# Patient Record
Sex: MALE | Race: WHITE | NOT HISPANIC OR LATINO
[De-identification: names, ages, dates, MRNs, and addresses within clinical notes are randomized per-mention and may not be internally consistent; named-entity substitution may affect disease eponyms.]

---

## 2017-03-17 ENCOUNTER — APPOINTMENT (OUTPATIENT)
Dept: BARIATRICS | Facility: CLINIC | Age: 57
End: 2017-03-17

## 2017-03-20 ENCOUNTER — FORM ENCOUNTER (OUTPATIENT)
Age: 57
End: 2017-03-20

## 2017-03-21 ENCOUNTER — OUTPATIENT (OUTPATIENT)
Dept: OUTPATIENT SERVICES | Facility: HOSPITAL | Age: 57
LOS: 1 days | Discharge: HOME | End: 2017-03-21
Payer: COMMERCIAL

## 2017-03-21 ENCOUNTER — APPOINTMENT (OUTPATIENT)
Dept: GASTROENTEROLOGY | Facility: HOSPITAL | Age: 57
End: 2017-03-21

## 2017-03-21 PROCEDURE — 43235 EGD DIAGNOSTIC BRUSH WASH: CPT | Mod: XS

## 2017-03-21 PROCEDURE — 91110 GI TRC IMG INTRAL ESOPH-ILE: CPT

## 2017-03-21 PROCEDURE — 43235 EGD DIAGNOSTIC BRUSH WASH: CPT

## 2017-03-21 PROCEDURE — 91110 GI TRC IMG INTRAL ESOPH-ILE: CPT | Mod: 26

## 2017-03-22 ENCOUNTER — FORM ENCOUNTER (OUTPATIENT)
Age: 57
End: 2017-03-22

## 2017-03-26 DIAGNOSIS — D50.9 IRON DEFICIENCY ANEMIA, UNSPECIFIED: ICD-10-CM

## 2017-03-26 DIAGNOSIS — Z98.84 BARIATRIC SURGERY STATUS: ICD-10-CM

## 2017-03-26 DIAGNOSIS — E66.9 OBESITY, UNSPECIFIED: ICD-10-CM

## 2017-03-26 DIAGNOSIS — M10.9 GOUT, UNSPECIFIED: ICD-10-CM

## 2018-05-21 ENCOUNTER — APPOINTMENT (OUTPATIENT)
Dept: BARIATRICS | Facility: CLINIC | Age: 58
End: 2018-05-21

## 2018-05-21 VITALS — WEIGHT: 269 LBS | BODY MASS INDEX: 39.84 KG/M2 | HEIGHT: 69 IN

## 2018-05-23 ENCOUNTER — APPOINTMENT (OUTPATIENT)
Dept: BARIATRICS | Facility: CLINIC | Age: 58
End: 2018-05-23
Payer: COMMERCIAL

## 2018-05-23 VITALS
HEIGHT: 67.5 IN | SYSTOLIC BLOOD PRESSURE: 142 MMHG | DIASTOLIC BLOOD PRESSURE: 90 MMHG | OXYGEN SATURATION: 98 % | WEIGHT: 269 LBS | BODY MASS INDEX: 41.73 KG/M2 | TEMPERATURE: 98 F

## 2018-05-23 PROCEDURE — 99203 OFFICE O/P NEW LOW 30 MIN: CPT

## 2018-06-12 ENCOUNTER — OTHER (OUTPATIENT)
Age: 58
End: 2018-06-12

## 2018-06-18 ENCOUNTER — OTHER (OUTPATIENT)
Age: 58
End: 2018-06-18

## 2018-07-11 ENCOUNTER — APPOINTMENT (OUTPATIENT)
Dept: BARIATRICS | Facility: CLINIC | Age: 58
End: 2018-07-11

## 2018-07-23 ENCOUNTER — APPOINTMENT (OUTPATIENT)
Dept: BARIATRICS | Facility: CLINIC | Age: 58
End: 2018-07-23
Payer: COMMERCIAL

## 2018-07-23 VITALS — HEIGHT: 67.5 IN | WEIGHT: 271.25 LBS | BODY MASS INDEX: 42.08 KG/M2

## 2018-07-23 PROCEDURE — 97803 MED NUTRITION INDIV SUBSEQ: CPT

## 2018-08-01 ENCOUNTER — APPOINTMENT (OUTPATIENT)
Dept: BARIATRICS | Facility: CLINIC | Age: 58
End: 2018-08-01

## 2018-09-05 ENCOUNTER — APPOINTMENT (OUTPATIENT)
Dept: BARIATRICS | Facility: CLINIC | Age: 58
End: 2018-09-05
Payer: COMMERCIAL

## 2018-09-05 VITALS — WEIGHT: 273 LBS | HEIGHT: 67.5 IN | BODY MASS INDEX: 42.35 KG/M2

## 2018-09-05 PROCEDURE — 97803 MED NUTRITION INDIV SUBSEQ: CPT

## 2018-09-05 PROCEDURE — 97802 MEDICAL NUTRITION INDIV IN: CPT

## 2018-10-10 ENCOUNTER — APPOINTMENT (OUTPATIENT)
Dept: BARIATRICS | Facility: CLINIC | Age: 58
End: 2018-10-10
Payer: COMMERCIAL

## 2018-10-10 VITALS — HEIGHT: 67.5 IN | BODY MASS INDEX: 41.73 KG/M2 | WEIGHT: 269 LBS

## 2018-10-10 PROCEDURE — 97803 MED NUTRITION INDIV SUBSEQ: CPT

## 2018-12-05 ENCOUNTER — APPOINTMENT (OUTPATIENT)
Dept: BARIATRICS | Facility: CLINIC | Age: 58
End: 2018-12-05

## 2019-06-28 ENCOUNTER — APPOINTMENT (OUTPATIENT)
Dept: GASTROENTEROLOGY | Facility: CLINIC | Age: 59
End: 2019-06-28
Payer: COMMERCIAL

## 2019-06-28 PROCEDURE — 43235 EGD DIAGNOSTIC BRUSH WASH: CPT

## 2022-07-22 ENCOUNTER — APPOINTMENT (OUTPATIENT)
Dept: BARIATRICS | Facility: CLINIC | Age: 62
End: 2022-07-22

## 2022-07-29 ENCOUNTER — APPOINTMENT (OUTPATIENT)
Dept: BARIATRICS | Facility: CLINIC | Age: 62
End: 2022-07-29

## 2022-07-29 VITALS — WEIGHT: 255 LBS | BODY MASS INDEX: 39.35 KG/M2

## 2022-07-29 DIAGNOSIS — G47.30 SLEEP APNEA, UNSPECIFIED: ICD-10-CM

## 2022-07-29 PROCEDURE — 99214 OFFICE O/P EST MOD 30 MIN: CPT | Mod: 95

## 2022-07-29 NOTE — ASSESSMENT
[FreeTextEntry1] :  Patient will follow up with medical weight loss program  and to possibly start on Ozempic. Will order blood work and upper GI series.

## 2022-07-29 NOTE — REASON FOR VISIT
[Home] : at home, [unfilled] , at the time of the visit. [Medical Office: ([facility])___] : at the medical office located in  [Verbal consent obtained from patient] : the patient, [unfilled] [Follow-Up Visit] : a follow-up visit for

## 2022-07-29 NOTE — HISTORY OF PRESENT ILLNESS
[de-identified] : Lexa Segal 62 y/o male came in today for a consult for a possible revision. Patient's weight before surgery was 367 lbs, s/p gastric bypass in 2007 and patient's current weight is around 255 lbs. Patient reports the most active thing he does is walking constantly during work. Patient reports he has tried many dieting regime with little to no help in decreasing his weight. Discussed with the patient that bariatric surgery would be aggressive operation with a risk of losing muscle mass and bone loss. Discussed with the patient that patient was able to lose 70 pounds with in a few years and surgery may not be the best option as of now. Discussed with the patient the next step should be going to the medical weight loss program to start on Ozempic. Discussed with the patient the importance of maintaining a healthy lifestyle and following a healthy diet and exercising regularly. Will order blood work and upper GI series.

## 2024-02-07 ENCOUNTER — NON-APPOINTMENT (OUTPATIENT)
Age: 64
End: 2024-02-07

## 2024-02-08 ENCOUNTER — TRANSCRIPTION ENCOUNTER (OUTPATIENT)
Age: 64
End: 2024-02-08

## 2024-03-01 ENCOUNTER — APPOINTMENT (OUTPATIENT)
Dept: BARIATRICS | Facility: CLINIC | Age: 64
End: 2024-03-01
Payer: COMMERCIAL

## 2024-03-01 VITALS
OXYGEN SATURATION: 98 % | WEIGHT: 236 LBS | BODY MASS INDEX: 36.42 KG/M2 | DIASTOLIC BLOOD PRESSURE: 72 MMHG | TEMPERATURE: 97.2 F | HEART RATE: 58 BPM | SYSTOLIC BLOOD PRESSURE: 135 MMHG

## 2024-03-01 DIAGNOSIS — K56.609 UNSPECIFIED INTESTINAL OBSTRUCTION, UNSPECIFIED AS TO PARTIAL VERSUS COMPLETE OBSTRUCTION: ICD-10-CM

## 2024-03-01 PROCEDURE — 99203 OFFICE O/P NEW LOW 30 MIN: CPT

## 2024-03-01 RX ORDER — ALLOPURINOL 200 MG/1
TABLET ORAL
Refills: 0 | Status: ACTIVE | COMMUNITY

## 2024-03-01 RX ORDER — LIRAGLUTIDE 6 MG/ML
18 INJECTION, SOLUTION SUBCUTANEOUS
Qty: 2 | Refills: 3 | Status: COMPLETED | COMMUNITY
Start: 2018-05-23 | End: 2024-03-01

## 2024-03-01 NOTE — ADDENDUM
[FreeTextEntry1] :  Documented by Cheryl Torres acting as a Scribe for Dr. Gonzalo Ayala on Mar  1 2024 11:30AM

## 2024-03-01 NOTE — ASSESSMENT
[FreeTextEntry1] : 62 y/o M following up with the bariatrics center, post-op with Dr. Camacho, s/p gastric bypass in 2007. Had an obstruction and internal hernia through an adhesion, surgery was done on 02/08. Gained weight, from 222 to 236 current. Pt feels gassy and bloated, lots of pain after eating since surgery on 02/08. Taking miralax and colace twice a day, was instructed to post-surgery, if not pt becomes constipated. No edema in legs.

## 2024-03-01 NOTE — END OF VISIT
[FreeTextEntry3] : All medical record entries made by the Scribe were at my, Dr. Gonzalo Ayala, direction and personally dictated by me on Mar  1 2024 11:30AM. I have reviewed the chart and agree that the record accurately reflects my personal performance of the history, physical exam, assessment and plan. I have also personally directed, reviewed, and agreed with the chart.

## 2024-03-01 NOTE — HISTORY OF PRESENT ILLNESS
[de-identified] : 62 y/o M following up with the bariatrics center, post-op with Dr. Camacho, s/p gastric bypass in 2007. Had an obstruction and internal hernia through an adhesion.

## 2024-03-14 ENCOUNTER — APPOINTMENT (OUTPATIENT)
Dept: BARIATRICS | Facility: CLINIC | Age: 64
End: 2024-03-14
Payer: COMMERCIAL

## 2024-03-14 VITALS — BODY MASS INDEX: 35.95 KG/M2 | WEIGHT: 233 LBS

## 2024-03-14 PROCEDURE — 97802 MEDICAL NUTRITION INDIV IN: CPT | Mod: 93

## 2024-03-15 ENCOUNTER — APPOINTMENT (OUTPATIENT)
Dept: BARIATRICS | Facility: CLINIC | Age: 64
End: 2024-03-15
Payer: COMMERCIAL

## 2024-03-15 ENCOUNTER — APPOINTMENT (OUTPATIENT)
Dept: BARIATRICS | Facility: CLINIC | Age: 64
End: 2024-03-15

## 2024-03-15 PROCEDURE — 97803 MED NUTRITION INDIV SUBSEQ: CPT | Mod: 93

## 2024-05-10 ENCOUNTER — APPOINTMENT (OUTPATIENT)
Dept: BARIATRICS | Facility: CLINIC | Age: 64
End: 2024-05-10
Payer: COMMERCIAL

## 2024-05-10 VITALS — WEIGHT: 228 LBS | BODY MASS INDEX: 35.18 KG/M2

## 2024-05-10 PROCEDURE — 97803 MED NUTRITION INDIV SUBSEQ: CPT | Mod: 93

## 2024-06-04 ENCOUNTER — APPOINTMENT (OUTPATIENT)
Dept: BARIATRICS/WEIGHT MGMT | Facility: CLINIC | Age: 64
End: 2024-06-04
Payer: COMMERCIAL

## 2024-06-04 VITALS
WEIGHT: 235 LBS | HEIGHT: 67.5 IN | BODY MASS INDEX: 36.45 KG/M2 | DIASTOLIC BLOOD PRESSURE: 85 MMHG | SYSTOLIC BLOOD PRESSURE: 134 MMHG

## 2024-06-04 DIAGNOSIS — E66.9 OBESITY, UNSPECIFIED: ICD-10-CM

## 2024-06-04 DIAGNOSIS — Z98.84 ABNORMAL WEIGHT GAIN: ICD-10-CM

## 2024-06-04 DIAGNOSIS — R63.5 ABNORMAL WEIGHT GAIN: ICD-10-CM

## 2024-06-04 PROCEDURE — 99204 OFFICE O/P NEW MOD 45 MIN: CPT

## 2024-06-04 PROCEDURE — G2211 COMPLEX E/M VISIT ADD ON: CPT

## 2024-06-05 PROBLEM — E66.9 OBESITY (BMI 30-39.9): Status: ACTIVE | Noted: 2024-06-05

## 2024-06-05 PROBLEM — R63.5 WEIGHT GAIN STATUS POST GASTRIC BYPASS: Status: ACTIVE | Noted: 2018-05-23

## 2024-06-05 NOTE — HISTORY OF PRESENT ILLNESS
[Improved Health] : Improved health [Quality of Life] : Quality of life [FreeTextEntry1] : Medical Hx: DANIEL, small bowel obstruction Surgical Hx: RYGB Surgery Date: 2007 (335lb>210 lbs), Hernia repair 2/2024   He is interested in achieving more weight loss.   Previous medications:  Phen phen years ago Phentermine years ago Daily GLP1 yrs ago   Food Recall: Seeing RD, last visit 5/2024 ETOH: 2 drinks per night-wine/cocktail    Exercise Regimen: Walking; no formal physical activity    Labs: 2/2024 Creatinine: 1.01 GFR: 79

## 2024-06-14 RX ORDER — SEMAGLUTIDE 0.25 MG/.5ML
0.25 INJECTION, SOLUTION SUBCUTANEOUS
Qty: 2 | Refills: 2 | Status: ACTIVE | COMMUNITY
Start: 2024-06-14 | End: 1900-01-01

## 2024-07-10 ENCOUNTER — APPOINTMENT (OUTPATIENT)
Dept: BARIATRICS/WEIGHT MGMT | Facility: CLINIC | Age: 64
End: 2024-07-10
Payer: COMMERCIAL

## 2024-07-10 VITALS — BODY MASS INDEX: 35.06 KG/M2 | WEIGHT: 226 LBS | HEIGHT: 67.5 IN

## 2024-07-10 DIAGNOSIS — E66.9 OBESITY, UNSPECIFIED: ICD-10-CM

## 2024-07-10 PROCEDURE — 99213 OFFICE O/P EST LOW 20 MIN: CPT | Mod: 95

## 2024-08-15 ENCOUNTER — APPOINTMENT (OUTPATIENT)
Dept: BARIATRICS/WEIGHT MGMT | Facility: CLINIC | Age: 64
End: 2024-08-15
Payer: COMMERCIAL

## 2024-08-15 VITALS — BODY MASS INDEX: 33.86 KG/M2 | WEIGHT: 218.31 LBS | HEIGHT: 67.5 IN

## 2024-08-15 DIAGNOSIS — E66.9 OBESITY, UNSPECIFIED: ICD-10-CM

## 2024-08-15 PROCEDURE — G2211 COMPLEX E/M VISIT ADD ON: CPT

## 2024-08-15 PROCEDURE — 99213 OFFICE O/P EST LOW 20 MIN: CPT

## 2024-08-15 NOTE — HISTORY OF PRESENT ILLNESS
[FreeTextEntry1] : Medical Hx: DANIEL, small bowel obstruction Surgical Hx: RYGB Surgery Date: 2007 (335lb>210 lbs), Hernia repair 2/2024   He is interested in achieving more weight loss.   Previous medications:  Phen phen years ago Phentermine years ago Daily GLP1 yrs ago   Food Recall: Seeing RD, last visit 5/2024 ETOH: 2 drinks per night-wine/cocktail    Exercise Regimen: Walking; no formal physical activity    Labs: 2/2024 Creatinine: 1.01 GFR: 79  7/10/24--Feels well, on Wegovy 0.25 mg weekly, no nausea or acid reflux, BM yesterday and today; takes Colace. Has decreased ETOH intake; ordered alcohol free whisky/gin. High fiber & protein diet, drinking 1/2 gallon of water per day. WT on his scale prior to starting 232 lbs, today weighed 226 lbs.   8/15/24:  This visit was provided via telehealth using real-time 2-way audio visual technology. The patient, Lexa Segal, was located at home at the time of the visit. The provider, TESS ENNIS, was located in the home ([place]) at the time of the visit. The patient and provider participated in the telehealth encounter. Patient consented to video call.  Had a colonoscopy this week; no polyps.  On Wegovy 0.5 mg, -16.5 lbs total. Getting protein at meals. Active during the day, no formal exercise. Takes colace, BM every day. No nausea.

## 2024-09-05 ENCOUNTER — APPOINTMENT (OUTPATIENT)
Dept: BARIATRICS | Facility: CLINIC | Age: 64
End: 2024-09-05
Payer: COMMERCIAL

## 2024-09-05 VITALS — WEIGHT: 217 LBS | BODY MASS INDEX: 33.49 KG/M2

## 2024-09-05 PROCEDURE — 97803 MED NUTRITION INDIV SUBSEQ: CPT | Mod: 93

## 2024-09-13 ENCOUNTER — APPOINTMENT (OUTPATIENT)
Dept: BARIATRICS/WEIGHT MGMT | Facility: CLINIC | Age: 64
End: 2024-09-13
Payer: COMMERCIAL

## 2024-09-13 DIAGNOSIS — E66.9 OBESITY, UNSPECIFIED: ICD-10-CM

## 2024-09-13 PROCEDURE — G2211 COMPLEX E/M VISIT ADD ON: CPT | Mod: NC

## 2024-09-13 PROCEDURE — 99213 OFFICE O/P EST LOW 20 MIN: CPT

## 2024-09-13 NOTE — HISTORY OF PRESENT ILLNESS
[FreeTextEntry1] : Medical Hx: DANIEL, small bowel obstruction Surgical Hx: RYGB Surgery Date: 2007 (335lb>210 lbs), Hernia repair 2/2024   He is interested in achieving more weight loss.   Previous medications:  Phen phen years ago Phentermine years ago Daily GLP1 yrs ago   Food Recall: Seeing RD, last visit 5/2024 ETOH: 2 drinks per night-wine/cocktail    Exercise Regimen: Walking; no formal physical activity    Labs: 2/2024 Creatinine: 1.01 GFR: 79  7/10/24--Feels well, on Wegovy 0.25 mg weekly, no nausea or acid reflux, BM yesterday and today; takes Colace. Has decreased ETOH intake; ordered alcohol free whisky/gin. High fiber & protein diet, drinking 1/2 gallon of water per day. WT on his scale prior to starting 232 lbs, today weighed 226 lbs.   8/15/24:  This visit was provided via telehealth using real-time 2-way audio visual technology. The patient, Lexa Segal, was located at home at the time of the visit. The provider, TESS ENNIS, was located in the home ([place]) at the time of the visit. The patient and provider participated in the telehealth encounter. Patient consented to video call.  Had a colonoscopy this week; no polyps.  On Wegovy 0.5 mg, -16.5 lbs total. Getting protein at meals. Active during the day, no formal exercise. Takes colace, BM every day. No nausea.   9/13/24: This visit was provided via telehealth using real-time 2-way audio visual technology. The patient, Lexa Segal, was located at home at the time of the visit. The provider, TESS ENNIS, was located in the home ([place]) at the time of the visit. The patient and provider participated in the telehealth encounter. Patient consented to video call.  -19 lbs total. Using Colace to help with BM. No nausea. Indigestion occasionally. Walking in the evenings.

## 2024-10-15 ENCOUNTER — APPOINTMENT (OUTPATIENT)
Dept: BARIATRICS/WEIGHT MGMT | Facility: CLINIC | Age: 64
End: 2024-10-15
Payer: COMMERCIAL

## 2024-10-15 VITALS — WEIGHT: 215.31 LBS | HEIGHT: 67.5 IN | BODY MASS INDEX: 33.4 KG/M2

## 2024-10-15 DIAGNOSIS — E66.9 OBESITY, UNSPECIFIED: ICD-10-CM

## 2024-10-15 PROCEDURE — 99213 OFFICE O/P EST LOW 20 MIN: CPT

## 2024-10-15 PROCEDURE — G2211 COMPLEX E/M VISIT ADD ON: CPT | Mod: NC

## 2024-11-14 ENCOUNTER — APPOINTMENT (OUTPATIENT)
Dept: BARIATRICS/WEIGHT MGMT | Facility: CLINIC | Age: 64
End: 2024-11-14
Payer: COMMERCIAL

## 2024-11-14 VITALS — WEIGHT: 211 LBS | HEIGHT: 67.5 IN | BODY MASS INDEX: 32.73 KG/M2

## 2024-11-14 DIAGNOSIS — E66.9 OBESITY, UNSPECIFIED: ICD-10-CM

## 2024-11-14 PROCEDURE — 99212 OFFICE O/P EST SF 10 MIN: CPT

## 2025-01-16 ENCOUNTER — APPOINTMENT (OUTPATIENT)
Dept: BARIATRICS/WEIGHT MGMT | Facility: CLINIC | Age: 65
End: 2025-01-16
Payer: COMMERCIAL

## 2025-01-16 VITALS — HEIGHT: 67.5 IN | BODY MASS INDEX: 32.16 KG/M2 | WEIGHT: 207.31 LBS

## 2025-01-16 DIAGNOSIS — E66.9 OBESITY, UNSPECIFIED: ICD-10-CM

## 2025-01-16 DIAGNOSIS — G47.30 SLEEP APNEA, UNSPECIFIED: ICD-10-CM

## 2025-01-16 PROCEDURE — 99213 OFFICE O/P EST LOW 20 MIN: CPT

## 2025-04-03 ENCOUNTER — APPOINTMENT (OUTPATIENT)
Dept: BARIATRICS/WEIGHT MGMT | Facility: CLINIC | Age: 65
End: 2025-04-03
Payer: COMMERCIAL

## 2025-04-03 VITALS — BODY MASS INDEX: 31.49 KG/M2 | WEIGHT: 203 LBS | HEIGHT: 67.5 IN

## 2025-04-03 DIAGNOSIS — E66.9 OBESITY, UNSPECIFIED: ICD-10-CM

## 2025-04-03 DIAGNOSIS — G47.30 SLEEP APNEA, UNSPECIFIED: ICD-10-CM

## 2025-04-03 PROCEDURE — 99213 OFFICE O/P EST LOW 20 MIN: CPT | Mod: 95
